# Patient Record
Sex: MALE | Race: WHITE | Employment: FULL TIME | URBAN - METROPOLITAN AREA
[De-identification: names, ages, dates, MRNs, and addresses within clinical notes are randomized per-mention and may not be internally consistent; named-entity substitution may affect disease eponyms.]

---

## 2019-07-24 ENCOUNTER — OFFICE VISIT (OUTPATIENT)
Dept: OPHTHALMOLOGY | Facility: CLINIC | Age: 49
End: 2019-07-24
Attending: OPHTHALMOLOGY
Payer: COMMERCIAL

## 2019-07-24 ENCOUNTER — HOSPITAL ENCOUNTER (OUTPATIENT)
Dept: MRI IMAGING | Facility: CLINIC | Age: 49
End: 2019-07-24
Attending: OPHTHALMOLOGY
Payer: COMMERCIAL

## 2019-07-24 ENCOUNTER — TRANSFERRED RECORDS (OUTPATIENT)
Dept: HEALTH INFORMATION MANAGEMENT | Facility: CLINIC | Age: 49
End: 2019-07-24

## 2019-07-24 ENCOUNTER — HOSPITAL ENCOUNTER (OUTPATIENT)
Dept: GENERAL RADIOLOGY | Facility: CLINIC | Age: 49
End: 2019-07-24
Attending: RADIOLOGY
Payer: COMMERCIAL

## 2019-07-24 ENCOUNTER — TELEPHONE (OUTPATIENT)
Dept: OPHTHALMOLOGY | Facility: CLINIC | Age: 49
End: 2019-07-24

## 2019-07-24 ENCOUNTER — HOSPITAL ENCOUNTER (OUTPATIENT)
Dept: LAB | Facility: CLINIC | Age: 49
Discharge: HOME | End: 2019-07-24
Admitting: RADIOLOGY
Payer: COMMERCIAL

## 2019-07-24 DIAGNOSIS — H46.9 OPTIC NEUROPATHY, BILATERAL: ICD-10-CM

## 2019-07-24 DIAGNOSIS — H54.7 DECREASED VISION: ICD-10-CM

## 2019-07-24 DIAGNOSIS — H47.10 EDEMA OF OPTIC DISC OF LEFT EYE: ICD-10-CM

## 2019-07-24 DIAGNOSIS — T15.90XA FOREIGN BODY IN EYE, UNSPECIFIED LATERALITY, INITIAL ENCOUNTER: ICD-10-CM

## 2019-07-24 DIAGNOSIS — H54.7 DECREASED VISION: Primary | ICD-10-CM

## 2019-07-24 LAB — CRP SERPL-MCNC: <2.9 MG/L (ref 0–8)

## 2019-07-24 PROCEDURE — 92083 EXTENDED VISUAL FIELD XM: CPT | Mod: ZF | Performed by: OPHTHALMOLOGY

## 2019-07-24 PROCEDURE — 70553 MRI BRAIN STEM W/O & W/DYE: CPT

## 2019-07-24 PROCEDURE — 25500064 ZZH RX 255 OP 636: Performed by: OPHTHALMOLOGY

## 2019-07-24 PROCEDURE — 80053 COMPREHEN METABOLIC PANEL: CPT | Performed by: OPHTHALMOLOGY

## 2019-07-24 PROCEDURE — 86140 C-REACTIVE PROTEIN: CPT | Performed by: OPHTHALMOLOGY

## 2019-07-24 PROCEDURE — 92250 FUNDUS PHOTOGRAPHY W/I&R: CPT | Mod: ZF,59 | Performed by: OPHTHALMOLOGY

## 2019-07-24 PROCEDURE — 86255 FLUORESCENT ANTIBODY SCREEN: CPT | Performed by: OPHTHALMOLOGY

## 2019-07-24 PROCEDURE — 92133 CPTRZD OPH DX IMG PST SGM ON: CPT | Mod: ZF | Performed by: OPHTHALMOLOGY

## 2019-07-24 PROCEDURE — G0463 HOSPITAL OUTPT CLINIC VISIT: HCPCS | Mod: ZF

## 2019-07-24 PROCEDURE — 86038 ANTINUCLEAR ANTIBODIES: CPT | Performed by: OPHTHALMOLOGY

## 2019-07-24 PROCEDURE — 36415 COLL VENOUS BLD VENIPUNCTURE: CPT | Performed by: OPHTHALMOLOGY

## 2019-07-24 PROCEDURE — A9585 GADOBUTROL INJECTION: HCPCS | Performed by: OPHTHALMOLOGY

## 2019-07-24 PROCEDURE — 82164 ANGIOTENSIN I ENZYME TEST: CPT | Performed by: OPHTHALMOLOGY

## 2019-07-24 PROCEDURE — 85027 COMPLETE CBC AUTOMATED: CPT | Performed by: OPHTHALMOLOGY

## 2019-07-24 PROCEDURE — 85652 RBC SED RATE AUTOMATED: CPT | Performed by: OPHTHALMOLOGY

## 2019-07-24 PROCEDURE — 70543 MRI ORBT/FAC/NCK W/O &W/DYE: CPT

## 2019-07-24 PROCEDURE — 70030 X-RAY EYE FOR FOREIGN BODY: CPT

## 2019-07-24 RX ORDER — GADOBUTROL 604.72 MG/ML
8 INJECTION INTRAVENOUS ONCE
Status: COMPLETED | OUTPATIENT
Start: 2019-07-24 | End: 2019-07-24

## 2019-07-24 RX ADMIN — GADOBUTROL 8 ML: 604.72 INJECTION INTRAVENOUS at 19:53

## 2019-07-24 ASSESSMENT — VISUAL ACUITY
OS_SC+: +1
OS_SC: 20/30
OD_SC: NLP
METHOD: SNELLEN - LINEAR

## 2019-07-24 ASSESSMENT — CONF VISUAL FIELD
OD_NORMAL: 1
OS_INFERIOR_TEMPORAL_RESTRICTION: 1
METHOD: COUNTING FINGERS
OS_SUPERIOR_TEMPORAL_RESTRICTION: 1
OS_INFERIOR_NASAL_RESTRICTION: 1
OS_SUPERIOR_NASAL_RESTRICTION: 1

## 2019-07-24 ASSESSMENT — EXTERNAL EXAM - LEFT EYE: OS_EXAM: NORMAL

## 2019-07-24 ASSESSMENT — TONOMETRY
OS_IOP_MMHG: 17
IOP_METHOD: TONOPEN
OD_IOP_MMHG: 22

## 2019-07-24 ASSESSMENT — EXTERNAL EXAM - RIGHT EYE: OD_EXAM: NORMAL

## 2019-07-24 ASSESSMENT — SLIT LAMP EXAM - LIDS
COMMENTS: NORMAL
COMMENTS: NORMAL

## 2019-07-24 NOTE — NURSING NOTE
Chief Complaints and History of Present Illnesses   Patient presents with     Consult For     Chief Complaint(s) and History of Present Illness(es)     Consult For     Laterality: left eye    Onset: gradual    Onset: months ago    Quality: blurred vision    Frequency: constantly    Associated symptoms: floaters (past 24 hr), flashes (occ in the past 24 hrs) and headache (in the past month he has had couple of really bad h/a) (Va feels constricted  )    Pain scale: 0/10              Comments     States that about 2 yrs ago the RE he woke up with blurry va and 7 days later the va in that eye went to NLP.    States that yesterday afternoon the va in the LE was blurry  Anila Toscano COT 1:18 PM July 24, 2019

## 2019-07-24 NOTE — PROGRESS NOTES
"HPI:  Sanket Swift is a 48 year old monocular pt with h/o optic neuropathy (uncertain etiology, dx 2016) right eye now NLP who presents with one day history of worsening vision left eye. Yesterday patient noted an increase in small floaters OD, also noting intermittent\" tunnel vision\". Reports when reading, letters will \"white out\" for a second and people faces will white out. Denying changes with colors. Denying eye pain. No headaches, jaw claudication, scalp tenderness, weakness, or fatigue.    Saw optometrist 6 weeks ago, had DFE. Was told everything looked good, had minor glasses prescription change.    Had \"chest cold\" a month ago, took prednisone for 5 days. Not taking any other medications.     POH:  Optic Neuropathy right eye, 2016, unknown etiology: Course of events- 3 years ago patient sudden onset of \"green floaters\" right eye at night. The next morning pt reported blurry vision, saw optometrist who ultimately referred patient to ophthalmology at [facility] who believed process would be self-limiting, did not receive any medications. 7 days later progressed to NLP. Went to Dr. Chappell Neuro-Ophthalmology at [facility]- received course of prednisone with no improvement. MRI showing pituitary microadenoma not displacing impinging on optic chiasm. No other abnormalities of orbits or optic nerves. Deemed no potential recovery, patient followed up with optom afterwards for left eye care.    PMH:  No HTN   No DM2  No CAD  No PAD    FHx:  No glaucoma or AMD    SH: His permanent residence is in [place]. He is here in the [facility] working on a project locally likely until October of 2019.    Assessment and Plan:  (H47.10) Edema of optic disc of left eye  (H46.9) Optic neuropathy, bilateral  Comment: Right eye vision loss in 2015 with rapid progression over 1 week to NLP. Per patient, this began with optic disc edema and retinal hemorrhage. CRVO was considered. MRI at the time with incidental pituitary " "microadenoma, but no evidence of compressive or inflammatory optic nerve/orbital lesion. He was evaluated by Dr. Cerna, neuro-ophthalmology in [place]. Ultimately, he was told the reason for his right eye vision loss was unclear but possibly due to \"stroke\". Clinical scenario for NAION unusual given young age and lack of vascular risk factors.     He presents today with similar vision loss symptoms now left eye. He has pale-appearing left disc edema with inferior visual field defect. This is very concerning given the severity and rapidity of his prior right eye vision loss.    Ddx includes inflammatory or ischemic optic neuropathy (pseudo-foster edy). He is too young for classic GCA and denies other GCA symptoms. Could consider other vasculitides which may affect same vessel distribution as GCA. Prior MRI 11/2015 and 2/2016 (performed in [place], reports scanned in Epic) without nerve enhancement, and his presentation is atypical for optic neuritis. Less likely compressive, although did have incidentally identified pituitary microadenoma on prior MRI. Aleida's also less likely given long time course between the two eyes and severity of vision loss.     Plan:  MRI brain and orbits w/ and w/o contrast, to be done today  Lab work-up including ESR/CRP, CBC, CMP, ANCA, AUGUSTUS, ACE  Follow-up in neuro-ophthalmology clinic tomorrow with Dr. Obregon.    Sobia Gerard MD  Ophthalmology PGY-2  [facility]    Teaching statement:  Complete documentation of historical and exam elements from today's encounter can be found in the full encounter summary report (not reduplicated in this progress note). I personally obtained the chief complaint(s) and history of present illness.  I confirmed and edited as necessary the review of systems, past medical/surgical history, family history, social history, and examination findings as documented by others; and I examined the patient myself. I personally reviewed the " relevant tests, images, and reports as documented above.     I formulated and edited as necessary the assessment and plan and discussed the findings and management plan with the patient and family.    Samantha Jenkins MD  Comprehensive Ophthalmology & Ocular Pathology  Department of Ophthalmology and Visual Neurosciences  [email or web address]  Pager [phone]

## 2019-07-25 ENCOUNTER — OFFICE VISIT (OUTPATIENT)
Dept: OPHTHALMOLOGY | Facility: CLINIC | Age: 49
End: 2019-07-25
Attending: OPHTHALMOLOGY
Payer: COMMERCIAL

## 2019-07-25 DIAGNOSIS — H46.9 OPTIC NEUROPATHY, BILATERAL: Primary | ICD-10-CM

## 2019-07-25 DIAGNOSIS — H53.10 SUBJECTIVE VISUAL DISTURBANCE: ICD-10-CM

## 2019-07-25 DIAGNOSIS — H47.10 EDEMA OF OPTIC DISC OF LEFT EYE: ICD-10-CM

## 2019-07-25 DIAGNOSIS — H53.10 SUBJECTIVE VISUAL DISTURBANCE: Primary | ICD-10-CM

## 2019-07-25 LAB
ACE SERPL-CCNC: 41 U/L (ref 9–67)
ALBUMIN SERPL-MCNC: 3.9 G/DL (ref 3.4–5)
ALP SERPL-CCNC: 92 U/L (ref 40–150)
ALT SERPL W P-5'-P-CCNC: 40 U/L (ref 0–70)
ANCA AB PATTERN SER IF-IMP: NORMAL
ANION GAP SERPL CALCULATED.3IONS-SCNC: 3 MMOL/L (ref 3–14)
AST SERPL W P-5'-P-CCNC: 16 U/L (ref 0–45)
BILIRUB SERPL-MCNC: 0.5 MG/DL (ref 0.2–1.3)
BUN SERPL-MCNC: 17 MG/DL (ref 7–30)
C-ANCA TITR SER IF: NORMAL {TITER}
CALCIUM SERPL-MCNC: 9.1 MG/DL (ref 8.5–10.1)
CHLORIDE SERPL-SCNC: 104 MMOL/L (ref 94–109)
CO2 SERPL-SCNC: 30 MMOL/L (ref 20–32)
CREAT SERPL-MCNC: 1.04 MG/DL (ref 0.66–1.25)
ERYTHROCYTE [DISTWIDTH] IN BLOOD BY AUTOMATED COUNT: 13.1 % (ref 10–15)
ERYTHROCYTE [SEDIMENTATION RATE] IN BLOOD BY WESTERGREN METHOD: 11 MM/H (ref 0–15)
GFR SERPL CREATININE-BSD FRML MDRD: 64 ML/MIN/{1.73_M2}
GLUCOSE SERPL-MCNC: 90 MG/DL (ref 70–99)
HCT VFR BLD AUTO: 43.6 % (ref 40–53)
HGB BLD-MCNC: 15.4 G/DL (ref 13.3–17.7)
MCH RBC QN AUTO: 31 PG (ref 26.5–33)
MCHC RBC AUTO-ENTMCNC: 35.3 G/DL (ref 31.5–36.5)
MCV RBC AUTO: 88 FL (ref 78–100)
PLATELET # BLD AUTO: 314 10E9/L (ref 150–450)
POTASSIUM SERPL-SCNC: 4 MMOL/L (ref 3.4–5.3)
PROT SERPL-MCNC: 8 G/DL (ref 6.8–8.8)
RBC # BLD AUTO: 4.96 10E12/L (ref 4.4–5.9)
SODIUM SERPL-SCNC: 137 MMOL/L (ref 133–144)
WBC # BLD AUTO: 7.4 10E9/L (ref 4–11)

## 2019-07-25 PROCEDURE — G0463 HOSPITAL OUTPT CLINIC VISIT: HCPCS | Mod: ZF

## 2019-07-25 PROCEDURE — 92235 FLUORESCEIN ANGRPH MLTIFRAME: CPT | Mod: ZF | Performed by: OPHTHALMOLOGY

## 2019-07-25 RX ORDER — PREDNISONE 50 MG/1
1250 TABLET ORAL DAILY
Qty: 75 TABLET | Refills: 0 | Status: SHIPPED | OUTPATIENT
Start: 2019-07-25 | End: 2019-07-28

## 2019-07-25 ASSESSMENT — CONF VISUAL FIELD
OD_INFERIOR_NASAL_RESTRICTION: 1
OD_INFERIOR_TEMPORAL_RESTRICTION: 1
OD_SUPERIOR_NASAL_RESTRICTION: 1
OS_NORMAL: 1
OD_SUPERIOR_TEMPORAL_RESTRICTION: 1

## 2019-07-25 ASSESSMENT — CUP TO DISC RATIO
OD_RATIO: 0.25-0.3
OS_RATIO: NO CUP

## 2019-07-25 ASSESSMENT — TONOMETRY
IOP_METHOD: ICARE
OD_IOP_MMHG: 20
OS_IOP_MMHG: 18

## 2019-07-25 ASSESSMENT — VISUAL ACUITY
METHOD: SNELLEN - LINEAR
OD_SC: NLP
OS_SC: 20/20

## 2019-07-25 ASSESSMENT — EXTERNAL EXAM - LEFT EYE: OS_EXAM: NORMAL

## 2019-07-25 ASSESSMENT — EXTERNAL EXAM - RIGHT EYE: OD_EXAM: NORMAL

## 2019-07-25 ASSESSMENT — SLIT LAMP EXAM - LIDS
COMMENTS: NORMAL
COMMENTS: NORMAL

## 2019-07-25 NOTE — NURSING NOTE
"Chief Complaints and History of Present Illnesses   Patient presents with     Blurred Vision Follow-Up     Chief Complaint(s) and History of Present Illness(es)     Blurred Vision Follow-Up               Comments     Sanket Swift is a 48 year old male who presents today for    Describes little \"whiteout\" spots in the left eye.   NLP right eye.   No change since yesterday.     Cristy HERBERT 1:31 PM July 25, 2019                   "

## 2019-07-25 NOTE — LETTER
2019         RE:  :  MRN: Sanket Swift  1970  [id number]     Dear Dr. Jenkins:     Thank you for asking me to see your very pleasant patient, Sanket Swift, in neuro-ophthalmic consultation.  I would like to thank you for sending your records and I have summarized them in the history of present illness.  My assessment and plan are below.  For further details, please see my attached clinic note.      Assessment & Plan     Sanket Swift is a 48 year old male with the following diagnoses:   1. Optic neuropathy, bilateral    2. Subjective visual disturbance    3. Edema of optic disc of left eye       Patient is a 47 y/o M sent by Dr. Jenkins for consultation of left eye vision loss. He notes that he had increasing floaters and white out in the left eye that started 2 days ago. He notes certain sections of his visual field appear to turn white. Denies inciting trauma, eye pain, diplopia, weakness, redness, numbness/tingling, bowel/bladder dysfunction, use of erectile dysfunction medications, sleep apnea, recent hiking, exposure to ticks.  Denies exposure to any substances     He notes that he had sudden loss of vision in his right eye that he noticed when he woke up in 2016. He notes about 2 days prior to onset having severe headache.  His vision was blurry and over the next 5-7 days his vision was gone. He saw Dr. Chappell at the [facility] and was started on 60 mg PO prednisone without improvement in vision after being noted to have right optic nerve pallor with diffuse macular exudates and RPE changes. MRI brain and orbits obtained and noted to have a pituitary microadenoma, but no no nerve enhancement. NMO negative. 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      POH: Strabismus s/p surgery as a child; vision loss right eye   PMH: None  FH: Negative for AMD, glaucoma.  No family history of blood clots  SH: Current smoker, 1 ppd    Visual acuity today NLP right eye and 20/20 left eye.  Intraocular pressure normal in both eyes. Motility full. Large exotropia. Color plates unable right eye and full left eye. Slit lamp exam normal. Dilated fundus exam showed diffuse pallor RIGHT eye, and diffuse hyperemic edema left eye with no cup and slightly tortuous vessels.     Visual fields from 7/24/19 showed inferior altitudinal defect and superior arcuate defect in the left eye. Diffuse thinning present in the right eye on OCT rNFL and diffuse thickening in the left eye. General thickness 39 right eye and 251 left eye. ESR and CRP were obtained and normal. CBC and CMP normal. Serum ACE, ANCA, and AUGUSTUS pending. His fluorescein angiogram showed late leakage of the nerve left but otherwise normal.      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 He has an inferior altitudinal visual field defect with a superior arcuate defect in his left eye. His level of vision loss in the right eye is atypical of AION. He is too young to have giant cell arteritis and his ESR and CRP were normal. He does not have  macular edema nor does he have hemorrhages consistent with central retinal vein occlusion.   He does not have evidence of optic neuritis as there was no evidence of enhancement on the MRI obtained yesterday. I have personally reviewed the MRI images and did not see evidence of optic nerve enhancement. There was T2 hyperintensity involving the right optic nerve. Due to his monocular status will order LP and expand workup for treatable etiologies.     It is my impression that patient most likely has optic neuropathy left eye. It is possible this represents Anterior ischemic optic neuropathy (AION).   I personally reviewed his MRI and shows no evidence of optic nerve.  His vision loss in the RIGHT eye is atypical for Anterior ischemic optic neuropathy (AION) as it is highly unusual to have no light perception vision in the RIGHT eye and have exudates in Anterior  ischemic optic neuropathy (AION).  He also reports having pain in the right eye at the time.      I will have him start on oral prednisone 1250 mg once a day for  3 Days.  Side effects of prednisone discussed.  Follow up in 1 week.      Again, thank you for allowing me to participate in the care of your patient.      Sincerely,    Sunny Obregon MD  Professor  Ophthalmology Residency   Director of Neuro-Ophthalmology  [employer] [facility] [employer] Endowed Chair  Departments of Ophthalmology, Neurology, and Neurosurgery  [facility] [address] [place] [address]  [id number]  T - [phone]  F - [phone]  [email or web address] [email or web address]                Addendum: The [facility] is now enrolling patients in the Surgical IIHTT which randomizes patients with Idiopathic Intracranial Hypertension and moderate to severe vision loss to one of the following regimens:    1.  Acetazolamide + diet   2.  Ventriculoperitoneal shunt     3.  Optic nerve sheath fenestration     We would appreciate referral of any newly diagnosed case of bilateral papilledema, ideally before any laboratory evaluation.  Please contact our  at [email or web address] or [phone].  Thank you!

## 2019-07-25 NOTE — PROGRESS NOTES
Assessment & Plan     Sanket Swift is a 48 year old male with the following diagnoses:   1. Optic neuropathy, bilateral    2. Subjective visual disturbance    3. Edema of optic disc of left eye       Patient is a 49 y/o M sent by Dr. Jenkins for consultation of left eye vision loss. He notes that he had increasing floaters and white out in the left eye that started 2 days ago. He notes certain sections of his visual field appear to turn white. Denies inciting trauma, eye pain, diplopia, weakness, redness, numbness/tingling, bowel/bladder dysfunction, use of erectile dysfunction medications, sleep apnea, recent hiking, exposure to ticks.  Denies exposure to any substances     He notes that he had sudden loss of vision in his right eye that he noticed when he woke up in 2016. He notes about 2 days prior to onset having severe headache.  His vision was blurry and over the next 5-7 days his vision was gone. He saw Dr. Chappell at the [facility] and was started on 60 mg PO prednisone without improvement in vision after being noted to have right optic nerve pallor with diffuse macular exudates and RPE changes. MRI brain and orbits obtained and noted to have a pituitary microadenoma, but no no nerve enhancement. NMO negative. 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      POH: Strabismus s/p surgery as a child; vision loss right eye 2016  PMH: None  FH: Negative for AMD, glaucoma.  No family history of blood clots  SH: Current smoker, 1 ppd    Visual acuity today NLP right eye and 20/20 left eye. Intraocular pressure normal in both eyes. Motility full. Large exotropia. Color plates unable right eye and full left eye. Slit lamp exam normal. Dilated fundus exam showed diffuse pallor RIGHT eye, and diffuse hyperemic edema left eye with no cup and slightly tortuous vessels.     Visual fields from 7/24/19 showed inferior altitudinal defect and superior arcuate defect in the left eye. Diffuse thinning present in  the right eye on OCT rNFL and diffuse thickening in the left eye. General thickness 39 right eye and 251 left eye. ESR and CRP were obtained and normal. CBC and CMP normal. Serum ACE, ANCA, and AUGUSTUS pending. His fluorescein angiogram showed late leakage of the nerve left but otherwise normal.    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 He has an inferior altitudinal visual field defect with a superior arcuate defect in his left eye. His level of vision loss in the right eye is atypical of AION. He is too young to have giant cell arteritis and his ESR and CRP were normal. He does not have  macular edema nor does he have hemorrhages consistent with central retinal vein occlusion.   He does not have evidence of optic neuritis as there was no evidence of enhancement on the MRI obtained yesterday. I have personally reviewed the MRI images and did not see evidence of optic nerve enhancement. There was T2 hyperintensity involving the right optic nerve. Due to his monocular status will order LP and expand workup for treatable etiologies.     It is my impression that patient most likely has optic neuropathy left eye. It is possible this represents Anterior ischemic optic neuropathy (AION).   I personally reviewed his MRI and shows no evidence of optic nerve.  His vision loss in the RIGHT eye is atypical for Anterior ischemic optic neuropathy (AION) as it is highly unusual to have no light perception vision in the RIGHT eye and have exudates in Anterior ischemic optic neuropathy (AION).  He also reports having pain in the right eye at the time.      I will have him start on oral prednisone 1250 mg once a day for  3 Days.  Side effects of prednisone discussed.  Follow up in 1 week.  Very concerning situation and high risk for vision loss.           Attending Physician Attestation:  Complete documentation of historical and exam elements from today's encounter can  be found in the full encounter summary report (not reduplicated in this progress note).  I personally obtained the chief complaint(s) and history of present illness.  I confirmed and edited as necessary the review of systems, past medical/surgical history, family history, social history, and examination findings as documented by others; and I examined the patient myself.  I personally reviewed the relevant tests, images, and reports as documented above.  I formulated and edited as necessary the assessment and plan and discussed the findings and management plan with the patient and family. - Sunny Marquis MD  Ophthalmology, PGY-5  Neuro-Ophthalmology Fellow

## 2019-07-26 LAB — ANA SER QL IF: NEGATIVE

## 2019-08-01 ENCOUNTER — OFFICE VISIT (OUTPATIENT)
Dept: OPHTHALMOLOGY | Facility: CLINIC | Age: 49
End: 2019-08-01
Attending: OPHTHALMOLOGY
Payer: COMMERCIAL

## 2019-08-01 DIAGNOSIS — H46.9 OPTIC NEUROPATHY: ICD-10-CM

## 2019-08-01 DIAGNOSIS — H53.40 VISUAL FIELD DEFECT: ICD-10-CM

## 2019-08-01 DIAGNOSIS — H47.013 NON-ARTERITIC ANTERIOR ISCHEMIC OPTIC NEUROPATHY OF BOTH EYES: Primary | ICD-10-CM

## 2019-08-01 PROBLEM — F11.21 HISTORY OF NARCOTIC ADDICTION (H): Status: ACTIVE | Noted: 2019-08-01

## 2019-08-01 PROBLEM — J00 ACUTE NASOPHARYNGITIS: Status: ACTIVE | Noted: 2019-07-06

## 2019-08-01 PROCEDURE — 92083 EXTENDED VISUAL FIELD XM: CPT | Mod: ZF | Performed by: OPHTHALMOLOGY

## 2019-08-01 PROCEDURE — G0463 HOSPITAL OUTPT CLINIC VISIT: HCPCS | Mod: ZF

## 2019-08-01 PROCEDURE — 92133 CPTRZD OPH DX IMG PST SGM ON: CPT | Mod: ZF | Performed by: OPHTHALMOLOGY

## 2019-08-01 RX ORDER — PREDNISONE 20 MG/1
TABLET ORAL
Qty: 11 TABLET | Refills: 0 | Status: SHIPPED | OUTPATIENT
Start: 2019-08-01 | End: 2019-08-01

## 2019-08-01 RX ORDER — PREDNISONE 20 MG/1
TABLET ORAL
Qty: 74 TABLET | Refills: 0 | Status: SHIPPED | OUTPATIENT
Start: 2019-08-01 | End: 2019-09-12

## 2019-08-01 RX ORDER — BENZONATATE 100 MG/1
CAPSULE ORAL
COMMUNITY
Start: 2019-06-28 | End: 2019-08-01

## 2019-08-01 ASSESSMENT — TONOMETRY
IOP_METHOD: TONOPEN
OS_IOP_MMHG: 14
OD_IOP_MMHG: 22

## 2019-08-01 ASSESSMENT — SLIT LAMP EXAM - LIDS
COMMENTS: NORMAL
COMMENTS: NORMAL

## 2019-08-01 ASSESSMENT — CONF VISUAL FIELD
OD_SUPERIOR_TEMPORAL_RESTRICTION: 1
OD_INFERIOR_TEMPORAL_RESTRICTION: 1
METHOD: COUNTING FINGERS
OD_SUPERIOR_NASAL_RESTRICTION: 1
OD_INFERIOR_NASAL_RESTRICTION: 1

## 2019-08-01 ASSESSMENT — CUP TO DISC RATIO
OS_RATIO: NO CUP
OD_RATIO: 0.25-0.3

## 2019-08-01 ASSESSMENT — VISUAL ACUITY
OS_SC: 20/25
METHOD: SNELLEN - LINEAR
OD_SC: NLP
OS_SC+: -2

## 2019-08-01 ASSESSMENT — EXTERNAL EXAM - RIGHT EYE: OD_EXAM: NORMAL

## 2019-08-01 ASSESSMENT — EXTERNAL EXAM - LEFT EYE: OS_EXAM: NORMAL

## 2019-08-01 NOTE — PROGRESS NOTES
Assessment & Plan     Sanket Swift is a 48 year old male with the following diagnoses:   1. Optic neuropathy    2. Visual field defect       Patient is a 49 y/o M here for f/u of sequential anterior ischemic optic neuropathy. He notes that the white spots have resolved over the past week since starting the prednisone. He notes that he felt jittery and hyper while on the medication. He has seen a shimmering effect at night a couple of times.     Visual acuity is NLP in the right eye and 20/25 in the left eye. Intraocular pressure is 22 in the right eye and 14 in the left eye. There is an APD present in the right eye. Color plates are full in the left eye, and unable in the right eye.  Slit lamp exam normal. Dilated fundus exam stable compared to prior with diffuse pallor of the right optic nerve head and diffuse hyperemic edema of the left optic nerve head present.     Visual field of the left eye with inferior altitudinal defect. OCT rNFL with general thickness of 253 from 251 last week. Laboratory studies ordered by Dr. Jenkins all negative.     It is my impression that has atypical bilateral sequential anterior ischemic optic neuropathy. He has had some improvement in his visual field compared to last week with less superonasal defect. I will give him another course of prednisone 60 mg daily for 1 week, 50 mg daily for 1 week, 40 mg daily for 1 week, 30 mg daily for 1 week, 20 mg daily for 1 week, 10 mg daily for 1 week and then stop.  He still has an inferior altitudinal defect. His left optic nerve is still swollen and his vision may improve, worsen, or stay the same. Discussed that his final visual outcome will be present once the swelling has resolved. Will have patient return to clinic in 6 weeks or sooner for worsening symptoms or loss of vision.            Attending Physician Attestation:  Complete documentation of historical and exam elements from today's encounter can be found in the full  encounter summary report (not reduplicated in this progress note).  I personally obtained the chief complaint(s) and history of present illness.  I confirmed and edited as necessary the review of systems, past medical/surgical history, family history, social history, and examination findings as documented by others; and I examined the patient myself.  I personally reviewed the relevant tests, images, and reports as documented above.  I formulated and edited as necessary the assessment and plan and discussed the findings and management plan with the patient and family. I personally reviewed the ophthalmic test(s) associated with this encounter, agree with the interpretation(s) as documented by the resident/fellow, and have edited the corresponding report(s) as necessary.  - Sunny Marquis MD  Ophthalmology, PGY-5  Neuro-Ophthalmology Fellow

## 2019-08-01 NOTE — NURSING NOTE
Chief Complaints and History of Present Illnesses   Patient presents with     Ischemic Optic Neuropathy Follow Up     Chief Complaint(s) and History of Present Illness(es)     Ischemic Optic Neuropathy Follow Up     Laterality: left eye    Onset: 1 week ago    Frequency: constantly    Timing: throughout the day    Course: stable    Associated symptoms: photophobia.  Negative for eye pain, dryness, tearing, redness and headache    Pain scale: 0/10              Comments     Feels white out spots have been resolved since Prednisone therapy.   Bothered by fluorescent lighting as it causes a 'shimmering effect which causes me extreme anxiety.'  Awa BOOKER 7:21 AM 08/01/2019

## 2019-08-15 ENCOUNTER — OFFICE VISIT (OUTPATIENT)
Dept: OPHTHALMOLOGY | Facility: CLINIC | Age: 49
End: 2019-08-15
Attending: OPHTHALMOLOGY
Payer: COMMERCIAL

## 2019-08-15 DIAGNOSIS — H47.013 NON-ARTERITIC ANTERIOR ISCHEMIC OPTIC NEUROPATHY OF BOTH EYES: ICD-10-CM

## 2019-08-15 DIAGNOSIS — H53.10 SUBJECTIVE VISUAL DISTURBANCE: ICD-10-CM

## 2019-08-15 DIAGNOSIS — H47.013 NON-ARTERITIC ANTERIOR ISCHEMIC OPTIC NEUROPATHY OF BOTH EYES: Primary | ICD-10-CM

## 2019-08-15 PROCEDURE — G0463 HOSPITAL OUTPT CLINIC VISIT: HCPCS | Mod: ZF

## 2019-08-15 PROCEDURE — 92133 CPTRZD OPH DX IMG PST SGM ON: CPT | Mod: ZF | Performed by: OPHTHALMOLOGY

## 2019-08-15 PROCEDURE — 92083 EXTENDED VISUAL FIELD XM: CPT | Mod: ZF | Performed by: OPHTHALMOLOGY

## 2019-08-15 ASSESSMENT — TONOMETRY
OS_IOP_MMHG: 15
IOP_METHOD: ICARE
OD_IOP_MMHG: 13

## 2019-08-15 ASSESSMENT — SLIT LAMP EXAM - LIDS
COMMENTS: NORMAL
COMMENTS: NORMAL

## 2019-08-15 ASSESSMENT — CUP TO DISC RATIO
OD_RATIO: 0.25-0.3
OS_RATIO: 0.3

## 2019-08-15 ASSESSMENT — EXTERNAL EXAM - RIGHT EYE: OD_EXAM: NORMAL

## 2019-08-15 ASSESSMENT — VISUAL ACUITY
OD_SC: NLP
METHOD: SNELLEN - LINEAR
OS_SC: 20/20

## 2019-08-15 ASSESSMENT — EXTERNAL EXAM - LEFT EYE: OS_EXAM: NORMAL

## 2019-08-15 NOTE — PROGRESS NOTES
Assessment & Plan     Sanket Swift is a 48 year old male with the following diagnoses:   1. 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 Subjective visual disturbance       Patient is a 49 y/o M here for f/u of sequential anterior ischemic optic neuropathy. He notes that   His vision in the left eye is good. Currently taking 40 mg. Started 60 mg two weeks ago. He notes that when he is excited and amped up he feels that he has more floaters.     Visual acuity is NLP in the right eye and 20/20 in the left eye. Intraocular pressure is 13 in the right eye and 15 in the left eye. There is an APD present in the right eye. Color plates are full in the left eye, and unable in the right eye.  Slit lamp exam normal. Dilated fundus exam stable right eye compared to prior with diffuse pallor and improved optic disc edema left eye with only trace edema inferiorly and segmental pallor    Visual field of the left eye with inferior altitudinal defect. OCT rNFL with general thickness of 100 from 251 two weeks ago. Superiorly there is evidence of thinning. Laboratory studies ordered by Dr. Jenkins all negative.     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 His optic disc edema is much improved with only trace remaining inferior left eye.  I will have him taper prednisone 1.5 tabs for a week (30 mg), 0.5 tab for a week (10 mg) and then stop.  Follow up in 1 month or sooner if worsening symptoms.              Attending Physician Attestation:  Complete documentation of historical and exam elements from today's encounter can be found in the full encounter summary report (not reduplicated in this progress note).  I personally obtained the chief complaint(s) and history of present illness.  I confirmed and edited as necessary the review of systems, past medical/surgical history, family history, social history, and examination findings as  documented by others; and I examined the patient myself.  I personally reviewed the relevant tests, images, and reports as documented above.  I formulated and edited as necessary the assessment and plan and discussed the findings and management plan with the patient and family. I personally reviewed the ophthalmic test(s) associated with this encounter, agree with the interpretation(s) as documented by the resident/fellow, and have edited the corresponding report(s) as necessary.  - Sunny Marquis MD  Ophthalmology, PGY-5  Neuro-Ophthalmology Fellow

## 2019-08-15 NOTE — NURSING NOTE
Chief Complaints and History of Present Illnesses   Patient presents with     Blurred Vision Follow-Up     Chief Complaint(s) and History of Present Illness(es)     Blurred Vision Follow-Up               Comments     Sanket Swift is a 48 year old male who presents today for    1. Optic neuropathy  2. Sequential AION    Feels improvement since the last visit. No longer seeing floaters. Feels vision is clearer.     Cristy HERBERT 7:22 AM August 15, 2019

## 2019-08-15 NOTE — LETTER
8/15/2019         RE:  :  MRN: Sanket Swift  1970  [id number]     Dear Dr. Jenkins,    Your patient, Sanket Swift, returned for neuro-ophthalmic follow up. My assessment and plan are below.  For further details, please see my attached clinic note.           Assessment & Plan     Sanket Swift is a 48 year old male with the following diagnoses:   1. 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 Subjective visual disturbance       Patient is a 47 y/o M here for f/u of sequential anterior ischemic optic neuropathy. He notes that   His vision in the left eye is good. Currently taking 40 mg. Started 60 mg two weeks ago. He notes that when he is excited and amped up he feels that he has more floaters.     Visual acuity is NLP in the right eye and 20/20 in the left eye. Intraocular pressure is 13 in the right eye and 15 in the left eye. There is an APD present in the right eye. Color plates are full in the left eye, and unable in the right eye.  Slit lamp exam normal. Dilated fundus exam stable right eye compared to prior with diffuse pallor and improved optic disc edema left eye with only trace edema inferiorly and segmental pallor    Visual field of the left eye with inferior altitudinal defect. OCT rNFL with general thickness of 100 from 251 two weeks ago. Superiorly there is evidence of thinning. Laboratory studies ordered by Dr. Jenkins all negative.     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 His optic disc edema is much improved with only trace remaining inferior left eye.  I will have him taper prednisone 1.5 tabs for a week (30 mg), 0.5 tab for a week (10 mg) and then stop.  Follow up in 1 month or sooner if worsening symptoms.            Again, thank you for allowing me to participate in the care of your patient.      Sincerely,    Sunny Obregon MD  Professor  Ophthalmology Residency Program  Director  Director of Neuro-Ophthalmology  [employer] [facility] [employer] Endowed Chair  Departments of Ophthalmology, Neurology, and Neurosurgery  [facility] [address] [place]  [address]  T - [phone]   - [phone]  [email or web address] [email or web address]    DX = sequential IRIS STERLING     Addendum: The [facility] is now enrolling patients in the Surgical IIHTT which randomizes patients with Idiopathic Intracranial Hypertension and moderate to severe vision loss to one of the following regimens:    1.  Acetazolamide + diet   2.  Ventriculoperitoneal shunt     3.  Optic nerve sheath fenestration     We would appreciate referral of any newly diagnosed case of bilateral papilledema, ideally before any laboratory evaluation.  Please contact our  at [email or web address] or [phone].  Thank you!

## 2019-09-17 DIAGNOSIS — H47.013 NON-ARTERITIC ANTERIOR ISCHEMIC OPTIC NEUROPATHY OF BOTH EYES: Primary | ICD-10-CM

## 2019-09-17 DIAGNOSIS — H53.40 VISUAL FIELD DEFECT: ICD-10-CM
